# Patient Record
(demographics unavailable — no encounter records)

---

## 2025-07-28 NOTE — HISTORY OF PRESENT ILLNESS
[Dull/Aching] : dull/aching [Sharp] : sharp [de-identified] : 07/28/2025: fall 4 days ago w/ ankle pain. no tx to date. no prior ankle probs. denies pmh.  [] : Post Surgical Visit: no [[email or web address]] : R ankle